# Patient Record
Sex: FEMALE | Race: BLACK OR AFRICAN AMERICAN | NOT HISPANIC OR LATINO | Employment: FULL TIME
[De-identification: names, ages, dates, MRNs, and addresses within clinical notes are randomized per-mention and may not be internally consistent; named-entity substitution may affect disease eponyms.]

---

## 2017-09-10 ENCOUNTER — HEALTH MAINTENANCE LETTER (OUTPATIENT)
Age: 37
End: 2017-09-10

## 2019-07-14 ENCOUNTER — HOSPITAL ENCOUNTER (EMERGENCY)
Facility: CLINIC | Age: 39
Discharge: HOME | End: 2019-07-14
Attending: EMERGENCY MEDICINE | Admitting: EMERGENCY MEDICINE
Payer: COMMERCIAL

## 2019-07-14 VITALS
BODY MASS INDEX: 41.44 KG/M2 | OXYGEN SATURATION: 100 % | WEIGHT: 249 LBS | HEART RATE: 85 BPM | TEMPERATURE: 98.6 F | DIASTOLIC BLOOD PRESSURE: 89 MMHG | RESPIRATION RATE: 24 BRPM | SYSTOLIC BLOOD PRESSURE: 130 MMHG

## 2019-07-14 DIAGNOSIS — R00.0 SINUS TACHYCARDIA: ICD-10-CM

## 2019-07-14 LAB
ALBUMIN SERPL-MCNC: 3.7 G/DL (ref 3.4–5)
ALP SERPL-CCNC: 60 U/L (ref 40–150)
ALT SERPL W P-5'-P-CCNC: 14 U/L (ref 0–50)
ANION GAP SERPL CALCULATED.3IONS-SCNC: 8 MMOL/L (ref 3–14)
AST SERPL W P-5'-P-CCNC: 18 U/L (ref 0–45)
BASOPHILS # BLD AUTO: 0 10E9/L (ref 0–0.2)
BASOPHILS NFR BLD AUTO: 0.5 %
BILIRUB SERPL-MCNC: 0.4 MG/DL (ref 0.2–1.3)
BUN SERPL-MCNC: 13 MG/DL (ref 7–30)
CALCIUM SERPL-MCNC: 8.8 MG/DL (ref 8.5–10.1)
CHLORIDE SERPL-SCNC: 105 MMOL/L (ref 94–109)
CO2 SERPL-SCNC: 25 MMOL/L (ref 20–32)
CREAT SERPL-MCNC: 0.74 MG/DL (ref 0.52–1.04)
DIFFERENTIAL METHOD BLD: NORMAL
EOSINOPHIL # BLD AUTO: 0.1 10E9/L (ref 0–0.7)
EOSINOPHIL NFR BLD AUTO: 1.3 %
ERYTHROCYTE [DISTWIDTH] IN BLOOD BY AUTOMATED COUNT: 13.6 % (ref 10–15)
GFR SERPL CREATININE-BSD FRML MDRD: >90 ML/MIN/{1.73_M2}
GLUCOSE SERPL-MCNC: 83 MG/DL (ref 70–99)
HCG SERPL QL: NEGATIVE
HCT VFR BLD AUTO: 40.6 % (ref 35–47)
HGB BLD-MCNC: 13.7 G/DL (ref 11.7–15.7)
IMM GRANULOCYTES # BLD: 0 10E9/L (ref 0–0.4)
IMM GRANULOCYTES NFR BLD: 0.3 %
LYMPHOCYTES # BLD AUTO: 2.7 10E9/L (ref 0.8–5.3)
LYMPHOCYTES NFR BLD AUTO: 34 %
MAGNESIUM SERPL-MCNC: 1.9 MG/DL (ref 1.6–2.3)
MCH RBC QN AUTO: 31.4 PG (ref 26.5–33)
MCHC RBC AUTO-ENTMCNC: 33.7 G/DL (ref 31.5–36.5)
MCV RBC AUTO: 93 FL (ref 78–100)
MONOCYTES # BLD AUTO: 0.8 10E9/L (ref 0–1.3)
MONOCYTES NFR BLD AUTO: 10.3 %
NEUTROPHILS # BLD AUTO: 4.3 10E9/L (ref 1.6–8.3)
NEUTROPHILS NFR BLD AUTO: 53.6 %
NRBC # BLD AUTO: 0 10*3/UL
NRBC BLD AUTO-RTO: 0 /100
PLATELET # BLD AUTO: 248 10E9/L (ref 150–450)
POTASSIUM SERPL-SCNC: 3.4 MMOL/L (ref 3.4–5.3)
PROT SERPL-MCNC: 7.1 G/DL (ref 6.8–8.8)
RBC # BLD AUTO: 4.37 10E12/L (ref 3.8–5.2)
SODIUM SERPL-SCNC: 138 MMOL/L (ref 133–144)
TROPONIN I SERPL-MCNC: 0.02 UG/L (ref 0–0.04)
TSH SERPL DL<=0.005 MIU/L-ACNC: 1.21 MU/L (ref 0.4–4)
WBC # BLD AUTO: 8 10E9/L (ref 4–11)

## 2019-07-14 PROCEDURE — 25000128 H RX IP 250 OP 636: Performed by: EMERGENCY MEDICINE

## 2019-07-14 PROCEDURE — 84703 CHORIONIC GONADOTROPIN ASSAY: CPT | Performed by: EMERGENCY MEDICINE

## 2019-07-14 PROCEDURE — 96361 HYDRATE IV INFUSION ADD-ON: CPT

## 2019-07-14 PROCEDURE — 84484 ASSAY OF TROPONIN QUANT: CPT | Performed by: EMERGENCY MEDICINE

## 2019-07-14 PROCEDURE — 99285 EMERGENCY DEPT VISIT HI MDM: CPT | Mod: 25

## 2019-07-14 PROCEDURE — 93005 ELECTROCARDIOGRAM TRACING: CPT

## 2019-07-14 PROCEDURE — 84443 ASSAY THYROID STIM HORMONE: CPT | Performed by: EMERGENCY MEDICINE

## 2019-07-14 PROCEDURE — 80053 COMPREHEN METABOLIC PANEL: CPT | Performed by: EMERGENCY MEDICINE

## 2019-07-14 PROCEDURE — 96374 THER/PROPH/DIAG INJ IV PUSH: CPT

## 2019-07-14 PROCEDURE — 85025 COMPLETE CBC W/AUTO DIFF WBC: CPT | Performed by: EMERGENCY MEDICINE

## 2019-07-14 PROCEDURE — 83735 ASSAY OF MAGNESIUM: CPT | Performed by: EMERGENCY MEDICINE

## 2019-07-14 RX ORDER — SODIUM CHLORIDE 9 MG/ML
1000 INJECTION, SOLUTION INTRAVENOUS CONTINUOUS
Status: DISCONTINUED | OUTPATIENT
Start: 2019-07-14 | End: 2019-07-15 | Stop reason: HOSPADM

## 2019-07-14 RX ORDER — LIDOCAINE 40 MG/G
CREAM TOPICAL
Status: DISCONTINUED | OUTPATIENT
Start: 2019-07-14 | End: 2019-07-15 | Stop reason: HOSPADM

## 2019-07-14 RX ORDER — BUPROPION HYDROCHLORIDE 150 MG/1
150 TABLET ORAL
COMMUNITY
Start: 2019-02-14

## 2019-07-14 RX ORDER — BUSPIRONE HYDROCHLORIDE 7.5 MG/1
TABLET ORAL
COMMUNITY
Start: 2019-06-17 | End: 2020-07-14

## 2019-07-14 RX ORDER — ONDANSETRON 2 MG/ML
4 INJECTION INTRAMUSCULAR; INTRAVENOUS EVERY 30 MIN PRN
Status: DISCONTINUED | OUTPATIENT
Start: 2019-07-14 | End: 2019-07-15 | Stop reason: HOSPADM

## 2019-07-14 RX ADMIN — SODIUM CHLORIDE 1000 ML: 9 INJECTION, SOLUTION INTRAVENOUS at 20:42

## 2019-07-14 RX ADMIN — ONDANSETRON HYDROCHLORIDE 4 MG: 2 INJECTION, SOLUTION INTRAMUSCULAR; INTRAVENOUS at 20:49

## 2019-07-14 ASSESSMENT — ENCOUNTER SYMPTOMS
PALPITATIONS: 1
VOMITING: 0

## 2019-07-14 NOTE — ED AVS SNAPSHOT
[facility] Emergency Department  [address]  [facility] [address]  Phone:  [phone]  Fax:  [phone]                                    Kellie Connolly   MRN: [id number]    Department:  [facility] Emergency Department   Date of Visit:  7/14/2019           After Visit Summary Signature Page    I have received my discharge instructions, and my questions have been answered. I have discussed any challenges I see with this plan with the nurse or doctor.    ..........................................................................................................................................  Patient/Patient Representative Signature      ..........................................................................................................................................  Patient Representative Print Name and Relationship to Patient    ..................................................               ................................................  Date                                   Time    ..........................................................................................................................................  Reviewed by Signature/Title    ...................................................              ..............................................  Date                                               Time          22EPIC Rev 08/18

## 2019-07-15 LAB — INTERPRETATION ECG - MUSE: NORMAL

## 2019-07-15 NOTE — ED TRIAGE NOTES
Pt arrives with complaints of palpitations since last night when she arrived home after going out and having some ETOH, continued palpitations today, no cardiac hx. ABCs intact, A/O x4.

## 2019-07-15 NOTE — ED PROVIDER NOTES
History     Chief Complaint:  Palpitations      HPI   Kellie Connolly is a 39 year old female who presents with to the ED for evaluation of heart palpitations. She says that today she has been noticing her heart is racing. The patient says the feeling is mostly uncomfortable and not associated with a lot of pain. The patient says she ate around noon today. The patient says she was drinking last night, but did not have any alcohol this morning. She denies vomiting. Of note, the patient had a cup of coffee today and a powerade. Also of note, the patient is not on Celexa anymore.     Allergies:  Penicillin G: Nausea and Vomiting  Sulfa Drugs; Nausea and Vomiting    Medications:    Wellbutrin XL  Buspar  Fish oil  Multiple vitamins  Zantac    Past Medical History:    History reviewed.  No pertinent past medical history.    Past Surgical History:    History reviewed. No pertinent surgical history.    Family History:    Hypertension: Mother  Thyroid Disease: Mother    Social History:  Smoking status: Never Smoker  Alcohol use: Yes-occasional  Marital Status:   [2]       Review of Systems   Cardiovascular: Positive for palpitations.   Gastrointestinal: Negative for vomiting.         Physical Exam   First Vitals:  Patient Vitals for the past 24 hrs:   BP Temp Temp src Pulse Heart Rate Resp SpO2 Weight   07/14/19 2125 130/89 -- -- 85 87 24 -- --   07/14/19 2045 -- -- -- 89 89 13 100 % --   07/14/19 2030 145/85 98.6  F (37  C) Oral 98 98 20 100 % 112.9 kg (249 lb)       Physical Exam   Constitutional: She appears well-developed and well-nourished.   HENT:   Right Ear: External ear normal.   Left Ear: External ear normal.   Mouth/Throat: Oropharynx is clear and moist. No oropharyngeal exudate.   TM's clear bilaterally   Eyes: Pupils are equal, round, and reactive to light. Conjunctivae and EOM are normal. No scleral icterus.   Neck: Normal range of motion. Neck supple.   Cardiovascular: Normal rate, regular  rhythm, normal heart sounds and intact distal pulses. Exam reveals no gallop and no friction rub.   No murmur heard.  Pulmonary/Chest: Effort normal and breath sounds normal. No respiratory distress. She has no wheezes. She has no rales.   Abdominal: Soft. Bowel sounds are normal. She exhibits no distension and no mass. There is no tenderness.   Musculoskeletal: She exhibits no edema.   Neurological: She is alert.   Skin: Skin is warm and dry. Capillary refill takes less than 2 seconds. No rash noted.   Psychiatric: She has a normal mood and affect.       Emergency Department Course     ECG (20:29:00):  Rate 103 bpm. PA interval 158. QRS duration 90. QT/QTc 364/476. P-R-T axes 66 26 43.  Sinus tachycardia. Minimal voltage criteria for LVH, may be normal variant. Borderline ECG. Interpreted at  by Placido Guillen MD.    Laboratory:  CBC:  o/w WNL. (WBC 8.0, HGB 13.7, )     CMP: AWNL (Creatinine: 0.74)    Magnesium: 1.9    TSH: 1.21    Troponin I: 0.019    HCG qualitative Blood: Negative    Interventions:  2042 NS 1L IV Bolus  2049 Zofran 4 mg IV    Emergency Department Course:  Past medical records, nursing notes, and vitals reviewed.  2029: I performed an exam of the patient and obtained history, as documented above.    IV inserted and blood drawn.    2114: I rechecked the patient. Explained findings to patient. Patient is feeling well with heart rate in the 80s . She is drinking water.     2201: I rechecked the patient.  Findings and plan explained to the Patient. Patient discharged home with instructions regarding supportive care, medications, and reasons to return. The importance of close follow-up was reviewed.         Impression & Plan      Medical Decision Making:  This patient presents for evaluation of palpitations.  Initial ECG shows sinus rhyth..  A broad differential diagnosis was considered including SVT, Atrial fibrillation, ventricular arrhythmia, thyroid disease, acute electrolyte abnormality,   drugs/medications, caffeine intake or other stimulants, medication side effect, anemia, heart disease, PE, etc.  The workup and exam here in ED would indicate that supportive outpatient management is indicated.  I doubt PE as patient has no tachycardia and hypoxia or chest pain.  Doubt acute coronary syndrome given symptoms and exam.  Will have them follow up with PCP as outpatient.          Diagnosis:    ICD-10-CM    1. Sinus tachycardia R00.0        Disposition:  discharged to home      Bennie Wilson  7/14/2019   [facility] EMERGENCY DEPARTMENT  Scribe Disclosure:  I, Bennie Wilson, am serving as a scribe at 8:29 PM on 7/14/2019 to document services personally performed by Placido Guillen MD based on my observations and the provider's statements to me.        Placido Guillen MD  07/17/19 0802

## 2019-11-08 ENCOUNTER — HEALTH MAINTENANCE LETTER (OUTPATIENT)
Age: 39
End: 2019-11-08

## 2020-02-23 ENCOUNTER — HEALTH MAINTENANCE LETTER (OUTPATIENT)
Age: 40
End: 2020-02-23

## 2020-07-14 ENCOUNTER — HOSPITAL ENCOUNTER (EMERGENCY)
Facility: CLINIC | Age: 40
Discharge: HOME | End: 2020-07-14
Attending: EMERGENCY MEDICINE | Admitting: EMERGENCY MEDICINE
Payer: COMMERCIAL

## 2020-07-14 VITALS
OXYGEN SATURATION: 98 % | DIASTOLIC BLOOD PRESSURE: 90 MMHG | WEIGHT: 265 LBS | TEMPERATURE: 98.2 F | SYSTOLIC BLOOD PRESSURE: 111 MMHG | HEART RATE: 97 BPM | RESPIRATION RATE: 16 BRPM | BODY MASS INDEX: 44.1 KG/M2

## 2020-07-14 DIAGNOSIS — I83.899 BLEEDING FROM VARICOSE VEIN: ICD-10-CM

## 2020-07-14 PROCEDURE — 99282 EMERGENCY DEPT VISIT SF MDM: CPT

## 2020-07-14 ASSESSMENT — ENCOUNTER SYMPTOMS: WOUND: 1

## 2020-07-14 NOTE — ED AVS SNAPSHOT
[facility] Emergency Department  [address]  [facility] [address]  Phone:  [phone]  Fax:  [phone]                                    Kellie Connolly   MRN: [id number]    Department:  [facility] Emergency Department   Date of Visit:  7/14/2020           After Visit Summary Signature Page    I have received my discharge instructions, and my questions have been answered. I have discussed any challenges I see with this plan with the nurse or doctor.    ..........................................................................................................................................  Patient/Patient Representative Signature      ..........................................................................................................................................  Patient Representative Print Name and Relationship to Patient    ..................................................               ................................................  Date                                   Time    ..........................................................................................................................................  Reviewed by Signature/Title    ...................................................              ..............................................  Date                                               Time          22EPIC Rev 08/18

## 2020-07-15 NOTE — ED TRIAGE NOTES
Patient was getting out of the bath, when her bracelet caught on her right calf, which ruptured a varicose vein. EMS called and applied pressure bandage. Not currently pain. ABCDs intact.

## 2020-07-15 NOTE — ED NOTES
Bed: ED19  Expected date: 7/14/20  Expected time: 8:59 PM  Means of arrival:   Comments:  39 yo F BV2

## 2020-07-15 NOTE — ED PROVIDER NOTES
History     Chief Complaint:  Varicose Vein       HPI  Kellie Connolly is a 40 year old year old female with a history of hypothyroidism who presents for evaluation of varicose vein. She was getting out of the tub and was bent over when her varicose vein on her right leg near her knee got caught on her bracelet and opened about 30 minutes ago. She states that it was spurting blood and that it feels tender at the site of the bleeding. The patient is not on blood thinners.      Allergies:  Penicillins  Sulfa drugs       Medications:   Venlafaxine HCl  Wellbutrin  Fish oil  Zantac      Medical History:   Anxiety  Depressive disorder  Hypothyroidism  Mild cervical dysplasia  GERD  Obesity  HPV test positive      Surgical History   LAP cholecystectomy  Laparoscopy, endometriosis      Family History:   Hypertension  Thyroid disease      Social History:  Smoking Status: Negative  Smokeless Tobacco: Negative   Alcohol Use: Positive   Drug Use: Negative   Primary Physician: Vivian Cortez         Review of Systems   Skin: Positive for wound (right leg).   All other systems reviewed and are negative.      Physical Exam     Patient Vitals for the past 24 hrs:   BP Temp Temp src Pulse Resp SpO2 Weight   07/14/20 2113 138/65 98.2  F (36.8  C) Oral 111 16 100 % 120.2 kg (265 lb)          Physical Exam  General: Patient is alert, awake and interactive when I enter the room  Head: The scalp, face, and head appear normal  Eyes: Conjunctivae are normal  ENT: The nose is normal, Pinnae are normal, External acoustic canals are normal  Neck: Trachea midline  CV: Pulses are normal.   Resp: No respiratory distress   Musc: Normal muscular tone, moving all extremities.  Skin: Bleeding from a varicose vein just proximal to her right knee.  Neuro:  Speech is normal and fluent. Face is symmetric.   Psych: Normal affect.  Appropriate interactions.      Emergency Department Course     Emergency Department Course:    2129 Nursing  notes and vitals reviewed.    2139 I performed an exam of the patient as documented above.     2220 Findings and plan explained to the Patient. Patient discharged home with instructions regarding supportive care, medications, and reasons to return. The importance of close follow-up was reviewed. The patient was prescribed as below.    Impression & Plan     Medical Decision Making:  Patient is a 40-year-old female who presents emergency department with bleeding from varicose vein.  Bleeding was controlled with a pressure dressing.  On reevaluation we have achieved hemostasis.  She was observed for short period of time and did not have any ongoing bleeding.  The wound was dressed and she will follow-up with her PCP as needed.      Diagnosis:     ICD-10-CM    1. Bleeding from varicose vein  I83.899         Disposition:  Discharged to home.    Discharge Medications:  New Prescriptions    No medications on file       Scribe Disclosure:  Coretta ESPARZA, am serving as a scribe at 9:33 PM on 7/14/2020 to document services personally performed by Miguel Lemus MD based on my observations and the provider's statements to me.      Miguel Lemus MD  07/14/20 9258

## 2020-12-06 ENCOUNTER — HEALTH MAINTENANCE LETTER (OUTPATIENT)
Age: 40
End: 2020-12-06

## 2021-09-25 ENCOUNTER — HEALTH MAINTENANCE LETTER (OUTPATIENT)
Age: 41
End: 2021-09-25

## 2021-10-28 ENCOUNTER — HOSPITAL ENCOUNTER (EMERGENCY)
Facility: CLINIC | Age: 41
Discharge: HOME | End: 2021-10-28
Attending: EMERGENCY MEDICINE | Admitting: EMERGENCY MEDICINE
Payer: COMMERCIAL

## 2021-10-28 ENCOUNTER — APPOINTMENT (OUTPATIENT)
Dept: GENERAL RADIOLOGY | Facility: CLINIC | Age: 41
End: 2021-10-28
Attending: EMERGENCY MEDICINE
Payer: COMMERCIAL

## 2021-10-28 VITALS
TEMPERATURE: 98.4 F | DIASTOLIC BLOOD PRESSURE: 96 MMHG | SYSTOLIC BLOOD PRESSURE: 152 MMHG | OXYGEN SATURATION: 99 % | RESPIRATION RATE: 20 BRPM | HEART RATE: 102 BPM

## 2021-10-28 DIAGNOSIS — Z20.822 SUSPECTED 2019 NOVEL CORONAVIRUS INFECTION: ICD-10-CM

## 2021-10-28 PROCEDURE — 93005 ELECTROCARDIOGRAM TRACING: CPT

## 2021-10-28 PROCEDURE — 99284 EMERGENCY DEPT VISIT MOD MDM: CPT | Mod: 25

## 2021-10-28 PROCEDURE — 71045 X-RAY EXAM CHEST 1 VIEW: CPT

## 2021-10-28 ASSESSMENT — ENCOUNTER SYMPTOMS
WHEEZING: 0
CHEST TIGHTNESS: 0
CHILLS: 1
SHORTNESS OF BREATH: 1
COUGH: 1
FATIGUE: 1
FEVER: 1

## 2021-10-28 NOTE — ED TRIAGE NOTES
No smell/tast, headache, congestion, mild shortness of breath. Shortness of breath worse with activity. Had covid test yesterday has not gotten results. ABC Intact.

## 2021-10-29 LAB
ATRIAL RATE - MUSE: 92 BPM
DIASTOLIC BLOOD PRESSURE - MUSE: NORMAL MMHG
INTERPRETATION ECG - MUSE: NORMAL
P AXIS - MUSE: 57 DEGREES
PR INTERVAL - MUSE: 130 MS
QRS DURATION - MUSE: 80 MS
QT - MUSE: 352 MS
QTC - MUSE: 435 MS
R AXIS - MUSE: 44 DEGREES
SYSTOLIC BLOOD PRESSURE - MUSE: NORMAL MMHG
T AXIS - MUSE: 38 DEGREES
VENTRICULAR RATE- MUSE: 92 BPM

## 2021-10-29 NOTE — ED PROVIDER NOTES
History     Chief Complaint:  Sob, concern for covid    HPI   Kellie Connolly is a 41 year old female with a 3 to 4-day history of URI symptoms including congestion, cough, fevers chills, and dyspnea on exertion.  Yesterday she lost her sense of taste and smell and is concerned about Covid.  She did have a test at [facility] yesterday with results pending.  She does note that she has received both Covid vaccinations.  She has no obvious ill contacts but does note that she attended an unmasked symposium a couple days ago.  She does have a history of asthma but does not feel as though that is flared up on her.  She is a non-smoker.    Review of Systems   Constitutional: Positive for chills, fatigue and fever.   HENT: Positive for congestion.         Loss of taste and smell   Respiratory: Positive for cough and shortness of breath. Negative for chest tightness and wheezing.    Cardiovascular: Negative for chest pain.   All other systems reviewed and are negative.    Allergies:  Penicillin G  Sulfa Drugs      Medications:    buPROPion (WELLBUTRIN XL) 150 MG 24 hr tablet  FISH OIL  Multiple Vitamins-Minerals (WOMENS MULTIVITAMIN PLUS PO)  ranitidine (ZANTAC) 150 MG capsule  VENLAFAXINE HCL PO        Past Medical History:    Past Medical History:   Diagnosis Date     Anxiety      Depressive disorder      Patient Active Problem List    Diagnosis Date Noted     ERRONEOUS ENCOUNTER--DISREGARD 03/13/2012     Priority: Medium     Hypothyroidism 01/24/2012     Priority: Medium      Family History:    Family History   Problem Relation Age of Onset     Hypertension Mother      Thyroid Disease Mother      C.A.D. No family hx of      Diabetes No family hx of      Cancer No family hx of        Social History:  Non smoker  , lives with spouse and children  employed    Physical Exam     Patient Vitals for the past 24 hrs:   BP Temp Pulse Resp SpO2   10/28/21 2144 (!) 152/96 -- 102 20 99 %   10/28/21 2040 -- -- -- -- 99 %    10/28/21 2030 -- -- -- -- 100 %   10/28/21 2000 -- -- -- -- 99 %   10/28/21 1751 -- -- -- -- 99 %   10/28/21 1750 (!) 160/114 98.4  F (36.9  C) 109 18 --       Physical Exam  General: Patient is alert and cooperative.  HENT:  Wearing mask.  Eyes: EOMI. Normal conjunctiva.  Neck:  Normal range of motion and appearance.   Cardiovascular:  Rate 100 bpm, regular rhythm and normal heart sounds.   Pulmonary/Chest:  Effort normal. No wheezing or crackles.  Abdominal: Soft. No distension or tenderness.     Musculoskeletal: Normal range of motion. No edema or tenderness.   Neurological: oriented, normal strength, sensation, and coordination.   Skin: Warm and dry. No rash or bruising.   Psychiatric: Normal mood and affect. Normal behavior and judgement.      Emergency Department Course     Imaging:  XR Chest Port 1 View   Final Result   IMPRESSION: No focal infiltrate, pleural effusion or pneumothorax. The cardiac and mediastinal silhouettes are normal. Left midlung calcified granuloma.          Emergency Department Course:        Assessments:   I obtained history and examined the patient as noted above.    I rechecked the patient and explained findings     Disposition:  The patient was discharged to home.    Impression & Plan    Medical Decision Makin-year-old female is presented with URI symptoms along with loss of taste and smell. Clinical picture is highly suspicious for breakthrough Covid. She has been vaccinated fully. She is well-appearing with normal oxygen saturations. Testing was limited to a chest x-ray which is negative. She did have Covid testing done yesterday with through [facility] pharmacy with results pending I recommended that she self quarantine until results received. She is being discharged with a home pulse oximeter and is interested in enrollment in the [place] get well loop which has been done.    Covid-19  Kellie Connolly was evaluated during a global COVID-19 pandemic, which necessitated  consideration that the patient might be at risk for infection with the SARS-CoV-2 virus that causes COVID-19.   Applicable protocols for evaluation were followed during the patient's care.   COVID-19 was considered as part of the patient's evaluation.   Outpatient testing done yesterday, results pending    Diagnosis:    ICD-10-CM    1. Suspected 2019 novel coronavirus infection  Z20.822 COVID-19 GetWell Loop Referral       Discharge Medications:  Discharge Medication List as of 10/28/2021  9:07 PM            Scribe Disclosure:  IZain MD, am serving as a scribe at 7:45 PM on 10/28/2021 to document services personally performed by Zain Echevarria MD based on my observations and the provider's statements to me.      Zain Echevarria MD  10/28/21 [id number]

## 2022-01-15 ENCOUNTER — HEALTH MAINTENANCE LETTER (OUTPATIENT)
Age: 42
End: 2022-01-15

## 2023-01-07 ENCOUNTER — HEALTH MAINTENANCE LETTER (OUTPATIENT)
Age: 43
End: 2023-01-07

## 2023-02-05 ENCOUNTER — APPOINTMENT (OUTPATIENT)
Dept: GENERAL RADIOLOGY | Facility: CLINIC | Age: 43
End: 2023-02-05
Attending: EMERGENCY MEDICINE
Payer: COMMERCIAL

## 2023-02-05 ENCOUNTER — HOSPITAL ENCOUNTER (EMERGENCY)
Facility: CLINIC | Age: 43
Discharge: HOME | End: 2023-02-05
Attending: EMERGENCY MEDICINE | Admitting: EMERGENCY MEDICINE
Payer: COMMERCIAL

## 2023-02-05 VITALS
OXYGEN SATURATION: 98 % | RESPIRATION RATE: 18 BRPM | HEART RATE: 87 BPM | DIASTOLIC BLOOD PRESSURE: 82 MMHG | SYSTOLIC BLOOD PRESSURE: 148 MMHG | TEMPERATURE: 97 F

## 2023-02-05 DIAGNOSIS — S86.811A PATELLAR TENDON RUPTURE, RIGHT, INITIAL ENCOUNTER: ICD-10-CM

## 2023-02-05 PROCEDURE — 250N000013 HC RX MED GY IP 250 OP 250 PS 637: Performed by: EMERGENCY MEDICINE

## 2023-02-05 PROCEDURE — 73560 X-RAY EXAM OF KNEE 1 OR 2: CPT | Mod: RT

## 2023-02-05 PROCEDURE — 29505 APPLICATION LONG LEG SPLINT: CPT | Mod: RT

## 2023-02-05 PROCEDURE — 99284 EMERGENCY DEPT VISIT MOD MDM: CPT | Mod: 25

## 2023-02-05 RX ORDER — IBUPROFEN 600 MG/1
600 TABLET, FILM COATED ORAL ONCE
Status: DISCONTINUED | OUTPATIENT
Start: 2023-02-05 | End: 2023-02-05

## 2023-02-05 RX ORDER — IBUPROFEN 600 MG/1
600 TABLET, FILM COATED ORAL ONCE
Status: COMPLETED | OUTPATIENT
Start: 2023-02-05 | End: 2023-02-05

## 2023-02-05 RX ORDER — OXYCODONE HYDROCHLORIDE 5 MG/1
5 TABLET ORAL EVERY 6 HOURS PRN
Qty: 10 TABLET | Refills: 0 | Status: SHIPPED | OUTPATIENT
Start: 2023-02-05

## 2023-02-05 RX ORDER — OXYCODONE HYDROCHLORIDE 5 MG/1
5 TABLET ORAL ONCE
Status: COMPLETED | OUTPATIENT
Start: 2023-02-05 | End: 2023-02-05

## 2023-02-05 RX ADMIN — IBUPROFEN 600 MG: 600 TABLET, FILM COATED ORAL at 18:51

## 2023-02-05 RX ADMIN — OXYCODONE HYDROCHLORIDE 5 MG: 5 TABLET ORAL at 21:07

## 2023-02-05 ASSESSMENT — ACTIVITIES OF DAILY LIVING (ADL): ADLS_ACUITY_SCORE: 33

## 2023-02-06 NOTE — ED TRIAGE NOTES
"Pt stepped out onto ice leaving the salon today and slipped. Pt states that she heard a \"pop\" and started to have right knee pain. Pt alert and oriented. CMS intact.       "

## 2023-02-06 NOTE — ED PROVIDER NOTES
History     Chief Complaint:  Knee Injury     HPI   Kellie Connolly is a 42 year old female who presents with her  for concern of a right knee injury.  She was walking outside after leaving the hair salon today and she slipped on a curb.  She fell down and heard a pop in her right knee and had immediate pain.  She feels like she cannot move it and she has not been able to ambulate.  She did not hit her head.  She denies any injury elsewhere.  She is not on any blood thinning medications.  She received ibuprofen in triage.  She is not on any regular pain medications.  She does not have an orthopedic doctor.  No alcohol.    Independent Historian:   None - Patient Only    Review of External Notes: None     ROS:  Review of Systems  As noted in HPI.    Allergies:  Penicillin G  Sulfa Drugs     Medications:    buPROPion (WELLBUTRIN XL) 150 MG 24 hr tablet  FISH OIL  Multiple Vitamins-Minerals (WOMENS MULTIVITAMIN PLUS PO)  ranitidine (ZANTAC) 150 MG capsule  VENLAFAXINE HCL PO    Past Medical History:    Past Medical History:   Diagnosis Date     Anxiety      Depressive disorder        Past Surgical History:    No past surgical history on file.     Family History:    family history includes Hypertension in her mother; Thyroid Disease in her mother.    Social History:  Here with her .  She works in [employer] and states she can work from home.   reports that she has never smoked. She has never used smokeless tobacco. She reports current alcohol use. She reports that she does not use drugs.  PCP: Darya Shea     Physical Exam     Patient Vitals for the past 24 hrs:   BP Temp Temp src Pulse Resp SpO2   02/05/23 2145 (!) 148/82 -- -- 87 18 98 %   02/05/23 1851 (!) 147/81 97  F (36.1  C) Temporal 90 16 93 %   02/05/23 1849 -- -- -- 94 -- --        Physical Exam  General: Well-nourished, sitting in wheelchair with right leg extended.  Appears to be in pain  Eyes: PERRL, conjunctivae pink no scleral icterus  or conjunctival injection  ENT:  Moist mucus membranes  Respiratory:  No respiratory distress  CV: Normal rate.  Normal DP pulse in the right foot, distal capillary refill and temperature.  Skin: Warm, dry.  No rashes or petechiae  Musculoskeletal: Right knee with tenderness in the suprapatellar and on the medial aspect of the knee.  It seems to have a divot at the patella tendon and the patella does seem to be riding high.  Does have some tenderness at the tibial tuberosity.  Pain with movement of the knee.  Unable to extend the knee at all when the leg is placed in flexion.  No tenderness over the hip, femur, or remainder of leg, ankle or foot.  Normal dorsiflexion and plantarflexion at the ankle.  Neuro: Alert and oriented to person/place/time.  Normal distal sensation to light touch in the right foot and leg.  Psychiatric: Normal affect      Emergency Department Course     Imaging:  XR Knee Right 1/2 Views   Final Result   IMPRESSION: Marked patella francia is probably related to a patella tendon tear. There is soft tissue thickening and swelling in the patella tendon region. Joint spaces are maintained.         Report per radiology    Emergency Department Course & Assessments:     Interventions:  Medications   ibuprofen (ADVIL/MOTRIN) tablet 600 mg (600 mg Oral Given by Other 2/5/23 1851)   oxyCODONE (ROXICODONE) tablet 5 mg (5 mg Oral Given 2/5/23 2107)        Independent Interpretation (X-rays, CTs, rhythm strip):  I reviewed the knee x-ray and agree with the radiologist interpretation.  I see no signs of fracture.    Consultations/Discussion of Management or Tests:  I consulted with Dr. Jenkins  with trauma Ortho from [facility].  We discussed the plan of care and follow-up.  He agreed with plan for knee immobilizer, crutches, pain control and they will follow-up with her later tomorrow to make a follow-up appointment and plan for surgery.     Social Determinants of Health affecting care:    Patient has stairs in her home to get to her bedroom but reports that her  can assist her.    Assessments:  I examined and assessed the patient.  I had the tech place a knee immobilizer and placed the patient in crutches.  She was able to get to the bathroom.    Discussed the plan of care and follow-up with the patient after I spoke with orthopedics.  She and her  were in agreement with the plan.    Disposition:  The patient was discharged to home.     Impression & Plan      Medical Decision Making:  Kellie Connolly is a 42 year old female who sustained an injury to her right knee.  Based on the clinical examination, history and radiologic findings, I suspect she has a complete patellar tendon rupture.  We provided pain control.  There is no other signs of fracture on the x-ray.  I consulted with our orthopedic surgeon on-call and she was placed in a knee immobilizer and given crutches.  She will likely need surgery and they will arrange for follow-up tomorrow.  Patient is given a prescription for oxycodone for additional pain relief.  Remainder of head to toe examination is normal and I do feel comfortable that she safe for discharge at this time.  She is asked return if any worsening.    Diagnosis:    ICD-10-CM    1. Patellar tendon rupture, right, initial encounter  S86.811A Crutches Order     Knee Supplies Order Knee Immobilizer; Right           Discharge Medications:  Discharge Medication List as of 2/5/2023  9:36 PM      START taking these medications    Details   oxyCODONE (ROXICODONE) 5 MG tablet Take 1 tablet (5 mg) by mouth every 6 hours as needed for severe pain (7-10), Disp-10 tablet, R-0, [employer]                Annie Handy MD  2/5/2023          Annie Handy MD  02/05/23 6527

## 2023-02-06 NOTE — DISCHARGE INSTRUCTIONS
*Wear knee immobilizer as directed.  Use crutches.  Rest, ice, elevation.  *Take medications as prescribed.  Ibuprofen and/or tylenol for pain and oxycodone for severe pain not relieved by ibuprofen and tylenol.  Recommend taking a stool softener like MiraLAX while taking oxycodone as it causes constipation.  Continue your current medications.  *Follow-up with orthopedics i this week for reevaluation.  The [facility] orthopedics office will be reaching out to schedule an appointment for you as you will likely need surgery.  *Return if you develop numbness, severe pain, cool or cold toes, or become worse in any way.    Home  Back  SP    Sprain:Knee    A sprain is an injury to the ligaments or capsule that holds a joint together. There are no broken bones. Most sprains take three to six weeks to heal. If the ligament is completely torn (severe sprain), it can take months to recover from.  Most knee sprains are treated with a splint, knee immobilizer or elastic wrap for support. Severe sprains may require surgery.  Home Care:  Stay off the injured leg as much as possible until you can walk on it without pain. If you have a lot of pain with walking, crutches or a walker may be prescribed. (These can be rented or purchased at many pharmacies and surgical or orthopedic supply stores). Follow your doctor's advice regarding when to begin bearing weight on that leg.  Keep your leg elevated to reduce pain and swelling. When sleeping, place a pillow under the injured leg. When sitting, support the injured leg so it is level with your waist. This is very important during the first 48 hours.  Apply an ice pack (ice cubes in a plastic bag, wrapped in a towel) over the injured area for 20 minutes every 1-2 hours the first day. You can place the ice pack directly over the splint. If a Velcro knee immobilizer was applied, you can open this to apply the ice pack directly to the knee. Continue with ice packs 3-4 times a day for the  next two days, then as needed for the relief of pain and swelling.  You may use acetaminophen (Tylenol) or ibuprofen (Motrin, Advil) to control pain, unless another pain medicine was prescribed. [NOTE: If you have chronic liver or kidney disease or ever had a stomach ulcer or GI bleeding, talk with your doctor before using these medicines.]  If you were given a splint, keep it completely dry at all times. Bathe with your splint out of the water, protected with a large plastic bag, rubber-banded at the top end. If a fiberglass splint gets wet, you can dry it with a hair-dryer. If you have a Velcro knee immobilizer, you can remove this to bathe, unless told otherwise.  Follow Up  with your doctor, or as advised, within 1-2 weeks.  [NOTE: If X-rays were taken, they will be reviewed by a radiologist. You will be notified of any new findings that may affect your care.]  Get Prompt Medical Attention  if any of the following occur:  The plaster cast or splint becomes wet or soft  The fiberglass cast or splint remains wet for more than 24 hours  Pain or swelling increases  Toes become cold, blue, numb or tingly    4010-0108 [facility], [address] [place] [address], [place] [address]. All rights reserved. This information is not intended as a substitute for professional medical care. Always follow your healthcare professional's instructions.    Home  Back  SP  fr  pl  RU  VI  CH    Knee Pain, Possible Torn Meniscus    The  meniscus  is a tough cartilage pad that cushions the inside of the knee joint. It serves as a shock absorber and spreads the weight of your body evenly across the knee joint. This prevents excess wear and tear to the bones of that joint.  The most common causes of meniscal tears are due to injury (especially related to sports) and degenerative disease (as occurs with aging).  A meniscus tear commonly occurs during a twisting injury when the knee is bent. This causes pain, swelling, reduced movement of  the knee and difficulty walking. There may be popping, clicking, joint locking or inability to completely straighten the knee. Ligaments of the knee may also be injured.  Initial diagnosis of a torn meniscus is by physical exam and x-rays. In the case of an acute injury, the knee may be too painful to examine fully. A more accurate exam can be performed after the initial swelling goes down. An MRI (magnetic image scan) may be ordered to make a final diagnosis.  Initial treatment of a suspected meniscal injury is with ice and rest and preventing movement of the knee. A splint or Velcro knee immobilizer may be applied to protect the joint. Depending on the severity of the injury, surgery may be required. A cartilage injury may take 4-12 weeks to heal depending on the severity.  Home Care:  Stay off the injured leg as much as possible until you can walk on it without pain. If you have a lot of pain with walking, crutches or a walker may be prescribed. (These can be rented or purchased at many pharmacies and surgical or orthopedic supply stores). Follow your doctor's advice regarding when to begin bearing weight on that leg.  Keep your leg elevated to reduce pain and swelling. When sleeping, place a pillow under the injured leg. When sitting, support the injured leg so it is level with your waist. This is very important during the first 48 hours.  Apply an ice pack (ice cubes in a plastic bag, wrapped in a towel) over the injured area for 20 minutes every 1-2 hours the first day. You can place the ice pack directly over the splint. If a Velcro knee immobilizer was applied, you can open this to apply the ice pack directly to the knee. Continue with ice packs 3-4 times a day for the next two days, then as needed for the relief of pain and swelling.  You may use acetaminophen (Tylenol) or ibuprofen (Motrin, Advil) to control pain, unless another pain medicine was prescribed. [NOTE: If you have chronic liver or kidney  disease or ever had a stomach ulcer, talk with your doctor before using these medicines.]  If you were given a splint, keep it completely dry at all times. Bathe with your splint out of the water, protected with a large plastic bag, rubber-banded at the top end. If a fiberglass splint gets wet, you can dry it with a hair-dryer. If you have a Velcro knee immobilizer, you can remove this to bathe, unless told otherwise.  Check with your doctor before returning to sports or full work duties.  Follow Up  with your doctor, or as advised, within 1-2 weeks for another exam. Further testing may be required to assess the extent of your injury.  [NOTE: If X-rays were taken, they will be reviewed by a radiologist. You will be notified of any new findings that may affect your care.]  Get Prompt Medical Attention  if any of the following occur:  Toes or foot becomes swollen, cold, blue, numb or tingly  Pain or swelling increases over the knee or calf  Warmth or redness appears over the knee or calf  Shortness of breath or chest pain  Fever over 100.4 F (38.0 C)    4769-6003 [place] [address]. All rights reserved. This information is not intended as a substitute for professional medical care. Always follow your healthcare professional's instructions.      Opioid Medication Information    You have been given a prescription for an opioid (narcotic) pain medicine and/or have received a pain medicine while here in the Emergency Department. These medicines can make you drowsy or impaired. You must not drive, operate dangerous equipment, or engage in any other dangerous activities while taking these medications. If you drive while taking these medications, you could be arrested for DUI, or driving under the influence. Do not drink any alcohol while you are taking these medications.   Opioid pain medications can cause addiction. If you have a history of chemical dependency of any type, you are at  a higher risk of becoming addicted to pain medications.  Only take these prescribed medications to treat your pain when all other options have been tried. Take it for as short a time and as few doses as possible. Store your pain pills in a secure place, as they are frequently stolen and provide a dangerous opportunity for children or visitors in your house to start abusing these powerful medications. We will not replace any lost or stolen medicine.  As soon as your pain is better, you should flush all your remaining medication.   Many prescription pain medications contain Tylenol  (acetaminophen), including Vicodin , Tylenol #3 , Norco , Lortab , and Percocet .  You should not take any extra pills of Tylenol  if you are using these prescription medications or you can get very sick.  Do not ever take more than 4000 mg of acetaminophen in any 24 hour period.  All opioids tend to cause constipation. Drink plenty of water and eat foods that have a lot of fiber, such as fruits, vegetables, prune juice, apple juice and high fiber cereal.  Take a laxative if you don t move your bowels at least every other day. Miralax , Milk of Magnesia, Colace , or Senna  can be used to keep you regular.

## 2023-12-02 ENCOUNTER — HEALTH MAINTENANCE LETTER (OUTPATIENT)
Age: 43
End: 2023-12-02

## 2025-01-05 ENCOUNTER — HEALTH MAINTENANCE LETTER (OUTPATIENT)
Age: 45
End: 2025-01-05

## 2025-03-08 ENCOUNTER — HEALTH MAINTENANCE LETTER (OUTPATIENT)
Age: 45
End: 2025-03-08